# Patient Record
Sex: MALE | Race: BLACK OR AFRICAN AMERICAN | Employment: UNEMPLOYED | URBAN - METROPOLITAN AREA
[De-identification: names, ages, dates, MRNs, and addresses within clinical notes are randomized per-mention and may not be internally consistent; named-entity substitution may affect disease eponyms.]

---

## 2018-04-09 ENCOUNTER — APPOINTMENT (OUTPATIENT)
Dept: OCCUPATIONAL MEDICINE | Age: 48
End: 2018-04-09
Attending: FAMILY MEDICINE

## 2021-02-18 ENCOUNTER — OFFICE VISIT (OUTPATIENT)
Dept: INTERNAL MEDICINE | Age: 51
End: 2021-02-18

## 2021-02-18 VITALS
HEIGHT: 67 IN | BODY MASS INDEX: 30.61 KG/M2 | TEMPERATURE: 99 F | HEART RATE: 84 BPM | SYSTOLIC BLOOD PRESSURE: 134 MMHG | RESPIRATION RATE: 14 BRPM | DIASTOLIC BLOOD PRESSURE: 82 MMHG | WEIGHT: 195 LBS

## 2021-02-18 DIAGNOSIS — Z00.00 ROUTINE GENERAL MEDICAL EXAMINATION AT A HEALTH CARE FACILITY: Primary | ICD-10-CM

## 2021-02-18 DIAGNOSIS — B86 SCABIES: ICD-10-CM

## 2021-02-18 DIAGNOSIS — L98.9 SKIN LESIONS, GENERALIZED: ICD-10-CM

## 2021-02-18 PROCEDURE — 99386 PREV VISIT NEW AGE 40-64: CPT | Performed by: INTERNAL MEDICINE

## 2021-02-18 RX ORDER — IVERMECTIN 3 MG/1
200 TABLET ORAL ONCE
Qty: 12 TABLET | Refills: 0 | Status: SHIPPED | OUTPATIENT
Start: 2021-02-18 | End: 2021-04-08 | Stop reason: SDUPTHER

## 2021-02-18 ASSESSMENT — PATIENT HEALTH QUESTIONNAIRE - PHQ9
SUM OF ALL RESPONSES TO PHQ9 QUESTIONS 1 AND 2: 0
1. LITTLE INTEREST OR PLEASURE IN DOING THINGS: NOT AT ALL
CLINICAL INTERPRETATION OF PHQ9 SCORE: NO FURTHER SCREENING NEEDED
2. FEELING DOWN, DEPRESSED OR HOPELESS: NOT AT ALL
CLINICAL INTERPRETATION OF PHQ2 SCORE: NO FURTHER SCREENING NEEDED
SUM OF ALL RESPONSES TO PHQ9 QUESTIONS 1 AND 2: 0

## 2021-02-18 ASSESSMENT — ENCOUNTER SYMPTOMS
ROS SKIN COMMENTS: AS ABOVE
GASTROINTESTINAL NEGATIVE: 1
PSYCHIATRIC NEGATIVE: 1
EYES NEGATIVE: 1
CONSTITUTIONAL NEGATIVE: 1
ALLERGIC/IMMUNOLOGIC NEGATIVE: 1
ENDOCRINE NEGATIVE: 1
HEMATOLOGIC/LYMPHATIC NEGATIVE: 1
RESPIRATORY NEGATIVE: 1
NEUROLOGICAL NEGATIVE: 1

## 2021-02-22 ENCOUNTER — LAB SERVICES (OUTPATIENT)
Dept: LAB | Age: 51
End: 2021-02-22

## 2021-02-22 DIAGNOSIS — Z00.00 ROUTINE GENERAL MEDICAL EXAMINATION AT A HEALTH CARE FACILITY: ICD-10-CM

## 2021-02-22 LAB
ALBUMIN SERPL-MCNC: 4.1 G/DL (ref 3.6–5.1)
ALP SERPL-CCNC: 61 U/L (ref 45–115)
ALT SERPL W/O P-5'-P-CCNC: 25 U/L (ref 5–49)
AST SERPL-CCNC: 34 U/L (ref 14–43)
BILIRUB SERPL-MCNC: 0.2 MG/DL (ref 0–1.3)
BUN SERPL-MCNC: 13 MG/DL (ref 6–27)
CALCIUM SERPL-MCNC: 9.8 MG/DL (ref 8.6–10.6)
CHLORIDE SERPL-SCNC: 106 MMOL/L (ref 96–107)
CHOLEST SERPL-MCNC: 140 MG/DL (ref 140–200)
CO2 SERPL-SCNC: 29 MMOL/L (ref 22–32)
CREAT SERPL-MCNC: 1.2 MG/DL (ref 0.6–1.6)
GFR SERPL CREATININE-BSD FRML MDRD: >60 ML/MIN/{1.73M2}
GFR SERPL CREATININE-BSD FRML MDRD: >60 ML/MIN/{1.73M2}
GLUCOSE P FAST SERPL-MCNC: 95 MG/DL (ref 60–100)
HDLC SERPL-MCNC: 44 MG/DL
LDLC SERPL CALC-MCNC: 77 MG/DL (ref 30–100)
POTASSIUM SERPL-SCNC: 4.1 MMOL/L (ref 3.5–5.3)
PROT SERPL-MCNC: 7.2 G/DL (ref 6.4–8.5)
SODIUM SERPL-SCNC: 142 MMOL/L (ref 136–146)
TRIGL SERPL-MCNC: 96 MG/DL (ref 0–200)

## 2021-02-22 PROCEDURE — 80061 LIPID PANEL: CPT | Performed by: INTERNAL MEDICINE

## 2021-02-22 PROCEDURE — 36415 COLL VENOUS BLD VENIPUNCTURE: CPT | Performed by: INTERNAL MEDICINE

## 2021-02-22 PROCEDURE — 80053 COMPREHEN METABOLIC PANEL: CPT | Performed by: INTERNAL MEDICINE

## 2021-03-04 ENCOUNTER — OFFICE VISIT (OUTPATIENT)
Dept: DERMATOLOGY | Age: 51
End: 2021-03-04

## 2021-03-04 DIAGNOSIS — D23.9 DERMATOFIBROMA: Primary | ICD-10-CM

## 2021-03-04 PROCEDURE — 99203 OFFICE O/P NEW LOW 30 MIN: CPT | Performed by: DERMATOLOGY

## 2021-03-09 ENCOUNTER — TELEPHONE (OUTPATIENT)
Dept: INTERNAL MEDICINE | Age: 51
End: 2021-03-09

## 2021-03-10 RX ORDER — PERMETHRIN 50 MG/G
CREAM TOPICAL ONCE
Qty: 60 G | Refills: 0 | Status: SHIPPED | OUTPATIENT
Start: 2021-03-10 | End: 2021-04-08 | Stop reason: SDUPTHER

## 2021-04-08 ENCOUNTER — OFFICE VISIT (OUTPATIENT)
Dept: INTERNAL MEDICINE | Age: 51
End: 2021-04-08

## 2021-04-08 VITALS
TEMPERATURE: 96.2 F | HEART RATE: 84 BPM | SYSTOLIC BLOOD PRESSURE: 120 MMHG | WEIGHT: 192 LBS | BODY MASS INDEX: 30.13 KG/M2 | RESPIRATION RATE: 16 BRPM | DIASTOLIC BLOOD PRESSURE: 78 MMHG | HEIGHT: 67 IN

## 2021-04-08 DIAGNOSIS — B86 SCABIES: Primary | ICD-10-CM

## 2021-04-08 PROCEDURE — 99213 OFFICE O/P EST LOW 20 MIN: CPT | Performed by: INTERNAL MEDICINE

## 2021-04-08 RX ORDER — IVERMECTIN 3 MG/1
200 TABLET ORAL ONCE
Qty: 12 TABLET | Refills: 0 | Status: SHIPPED | OUTPATIENT
Start: 2021-04-08 | End: 2021-04-08

## 2021-04-08 RX ORDER — TRIAMCINOLONE ACETONIDE 0.25 MG/G
CREAM TOPICAL PRN
COMMUNITY
Start: 2021-03-31 | End: 2021-07-16 | Stop reason: ALTCHOICE

## 2021-04-08 RX ORDER — TADALAFIL 10 MG/1
10 TABLET ORAL PRN
Qty: 10 TABLET | Refills: 1 | Status: SHIPPED | OUTPATIENT
Start: 2021-04-08 | End: 2021-07-16 | Stop reason: ALTCHOICE

## 2021-04-08 RX ORDER — HYDROXYZINE HYDROCHLORIDE 25 MG/1
25 TABLET, FILM COATED ORAL 3 TIMES DAILY PRN
COMMUNITY
Start: 2021-03-31 | End: 2021-07-16 | Stop reason: ALTCHOICE

## 2021-04-08 RX ORDER — PERMETHRIN 50 MG/G
CREAM TOPICAL ONCE
Qty: 60 G | Refills: 0 | Status: SHIPPED | OUTPATIENT
Start: 2021-04-08 | End: 2021-04-08

## 2021-04-08 ASSESSMENT — ENCOUNTER SYMPTOMS
CHILLS: 0
SHORTNESS OF BREATH: 0
FEVER: 0

## 2021-05-26 ENCOUNTER — OFFICE VISIT (OUTPATIENT)
Dept: DERMATOLOGY | Age: 51
End: 2021-05-26

## 2021-05-26 DIAGNOSIS — L29.9 ITCHING: Primary | ICD-10-CM

## 2021-05-26 PROCEDURE — 99213 OFFICE O/P EST LOW 20 MIN: CPT | Performed by: DERMATOLOGY

## 2021-05-26 RX ORDER — HYDROCORTISONE VALERATE CREAM 2 MG/G
CREAM TOPICAL 2 TIMES DAILY
Qty: 30 G | Refills: 1 | Status: SHIPPED | OUTPATIENT
Start: 2021-05-26 | End: 2021-07-16 | Stop reason: ALTCHOICE

## 2021-05-26 RX ORDER — IVERMECTIN 3 MG/1
TABLET ORAL
COMMUNITY
Start: 2021-04-08 | End: 2021-07-16 | Stop reason: ALTCHOICE

## 2021-05-26 RX ORDER — PERMETHRIN 50 MG/G
CREAM TOPICAL
COMMUNITY
Start: 2021-04-08 | End: 2021-07-16 | Stop reason: ALTCHOICE

## 2021-06-03 ENCOUNTER — APPOINTMENT (OUTPATIENT)
Dept: DERMATOLOGY | Age: 51
End: 2021-06-03

## 2021-06-17 ENCOUNTER — TELEPHONE (OUTPATIENT)
Dept: DERMATOLOGY | Age: 51
End: 2021-06-17

## 2021-07-08 ENCOUNTER — APPOINTMENT (OUTPATIENT)
Dept: DERMATOLOGY | Age: 51
End: 2021-07-08

## 2021-07-12 ENCOUNTER — NURSE TRIAGE (OUTPATIENT)
Dept: DERMATOLOGY | Age: 51
End: 2021-07-12

## 2021-07-14 ENCOUNTER — OFFICE VISIT (OUTPATIENT)
Dept: DERMATOLOGY | Age: 51
End: 2021-07-14

## 2021-07-14 DIAGNOSIS — L98.9 DERMATOSIS: Primary | ICD-10-CM

## 2021-07-14 PROCEDURE — 11102 TANGNTL BX SKIN SINGLE LES: CPT | Performed by: DERMATOLOGY

## 2021-07-14 PROCEDURE — 99214 OFFICE O/P EST MOD 30 MIN: CPT | Performed by: DERMATOLOGY

## 2021-07-14 PROCEDURE — 11103 TANGNTL BX SKIN EA SEP/ADDL: CPT | Performed by: DERMATOLOGY

## 2021-07-14 RX ORDER — CLINDAMYCIN PHOSPHATE 10 UG/ML
LOTION TOPICAL
Qty: 120 ML | Refills: 11 | Status: SHIPPED | OUTPATIENT
Start: 2021-07-14

## 2021-07-16 ENCOUNTER — TELEPHONE (OUTPATIENT)
Dept: INTERNAL MEDICINE | Age: 51
End: 2021-07-16

## 2021-07-16 ENCOUNTER — OFFICE VISIT (OUTPATIENT)
Dept: INTERNAL MEDICINE | Age: 51
End: 2021-07-16

## 2021-07-16 ENCOUNTER — IMAGING SERVICES (OUTPATIENT)
Dept: GENERAL RADIOLOGY | Age: 51
End: 2021-07-16
Attending: INTERNAL MEDICINE

## 2021-07-16 VITALS
HEART RATE: 96 BPM | BODY MASS INDEX: 28.88 KG/M2 | TEMPERATURE: 98 F | RESPIRATION RATE: 16 BRPM | HEIGHT: 67 IN | DIASTOLIC BLOOD PRESSURE: 90 MMHG | WEIGHT: 184 LBS | SYSTOLIC BLOOD PRESSURE: 152 MMHG

## 2021-07-16 DIAGNOSIS — R07.81 RIB PAIN ON RIGHT SIDE: ICD-10-CM

## 2021-07-16 DIAGNOSIS — R07.81 RIB PAIN ON RIGHT SIDE: Primary | ICD-10-CM

## 2021-07-16 LAB — PATH REPORT PLASRBC-IMP: NORMAL

## 2021-07-16 PROCEDURE — 71101 X-RAY EXAM UNILAT RIBS/CHEST: CPT | Performed by: RADIOLOGY

## 2021-07-16 PROCEDURE — 99213 OFFICE O/P EST LOW 20 MIN: CPT | Performed by: INTERNAL MEDICINE

## 2021-07-16 RX ORDER — SILDENAFIL 50 MG/1
50 TABLET, FILM COATED ORAL PRN
Qty: 10 TABLET | Refills: 0 | Status: SHIPPED | OUTPATIENT
Start: 2021-07-16

## 2021-07-16 ASSESSMENT — PATIENT HEALTH QUESTIONNAIRE - PHQ9
SUM OF ALL RESPONSES TO PHQ9 QUESTIONS 1 AND 2: 0
SUM OF ALL RESPONSES TO PHQ9 QUESTIONS 1 AND 2: 0
CLINICAL INTERPRETATION OF PHQ9 SCORE: NO FURTHER SCREENING NEEDED
2. FEELING DOWN, DEPRESSED OR HOPELESS: NOT AT ALL
1. LITTLE INTEREST OR PLEASURE IN DOING THINGS: NOT AT ALL
CLINICAL INTERPRETATION OF PHQ2 SCORE: NO FURTHER SCREENING NEEDED

## 2021-07-16 ASSESSMENT — PAIN SCALES - GENERAL: PAINLEVEL: 9

## 2021-07-21 RX ORDER — PIMECROLIMUS 10 MG/G
CREAM TOPICAL 2 TIMES DAILY
Qty: 100 G | Refills: 1 | Status: SHIPPED | OUTPATIENT
Start: 2021-07-21

## 2021-07-22 ENCOUNTER — TELEPHONE (OUTPATIENT)
Dept: DERMATOLOGY | Age: 51
End: 2021-07-22

## 2021-07-23 ENCOUNTER — TELEPHONE (OUTPATIENT)
Dept: DERMATOLOGY | Age: 51
End: 2021-07-23

## 2024-10-29 ENCOUNTER — OFFICE VISIT (OUTPATIENT)
Dept: INTERNAL MEDICINE CLINIC | Facility: CLINIC | Age: 54
End: 2024-10-29
Payer: COMMERCIAL

## 2024-10-29 VITALS
DIASTOLIC BLOOD PRESSURE: 90 MMHG | TEMPERATURE: 97 F | HEIGHT: 67 IN | WEIGHT: 204 LBS | OXYGEN SATURATION: 97 % | HEART RATE: 98 BPM | BODY MASS INDEX: 32.02 KG/M2 | SYSTOLIC BLOOD PRESSURE: 150 MMHG

## 2024-10-29 DIAGNOSIS — Z00.00 HEALTH MAINTENANCE EXAMINATION: Primary | ICD-10-CM

## 2024-10-29 DIAGNOSIS — I10 PRIMARY HYPERTENSION: ICD-10-CM

## 2024-10-29 DIAGNOSIS — E66.811 CLASS 1 OBESITY DUE TO EXCESS CALORIES WITH SERIOUS COMORBIDITY AND BODY MASS INDEX (BMI) OF 31.0 TO 31.9 IN ADULT: ICD-10-CM

## 2024-10-29 DIAGNOSIS — N52.9 ERECTILE DYSFUNCTION, UNSPECIFIED ERECTILE DYSFUNCTION TYPE: ICD-10-CM

## 2024-10-29 DIAGNOSIS — E66.09 CLASS 1 OBESITY DUE TO EXCESS CALORIES WITH SERIOUS COMORBIDITY AND BODY MASS INDEX (BMI) OF 31.0 TO 31.9 IN ADULT: ICD-10-CM

## 2024-10-29 DIAGNOSIS — F17.210 CIGARETTE SMOKER: ICD-10-CM

## 2024-10-29 LAB
ALBUMIN SERPL-MCNC: 4.7 G/DL (ref 3.2–4.8)
ALBUMIN/GLOB SERPL: 1.5 {RATIO} (ref 1–2)
ALP LIVER SERPL-CCNC: 56 U/L
ALT SERPL-CCNC: 44 U/L
ANION GAP SERPL CALC-SCNC: 10 MMOL/L (ref 0–18)
AST SERPL-CCNC: 46 U/L (ref ?–34)
BASOPHILS # BLD AUTO: 0.03 X10(3) UL (ref 0–0.2)
BASOPHILS NFR BLD AUTO: 0.6 %
BILIRUB SERPL-MCNC: 0.5 MG/DL (ref 0.3–1.2)
BILIRUB UR QL: NEGATIVE
BUN BLD-MCNC: 15 MG/DL (ref 9–23)
BUN/CREAT SERPL: 12.2 (ref 10–20)
CALCIUM BLD-MCNC: 10 MG/DL (ref 8.7–10.4)
CHLORIDE SERPL-SCNC: 110 MMOL/L (ref 98–112)
CHOLEST SERPL-MCNC: 171 MG/DL (ref ?–200)
CLARITY UR: CLEAR
CO2 SERPL-SCNC: 25 MMOL/L (ref 21–32)
COLOR UR: YELLOW
CREAT BLD-MCNC: 1.23 MG/DL
DEPRECATED RDW RBC AUTO: 47 FL (ref 35.1–46.3)
EGFRCR SERPLBLD CKD-EPI 2021: 71 ML/MIN/1.73M2 (ref 60–?)
EOSINOPHIL # BLD AUTO: 0.06 X10(3) UL (ref 0–0.7)
EOSINOPHIL NFR BLD AUTO: 1.3 %
ERYTHROCYTE [DISTWIDTH] IN BLOOD BY AUTOMATED COUNT: 13.1 % (ref 11–15)
EST. AVERAGE GLUCOSE BLD GHB EST-MCNC: 114 MG/DL (ref 68–126)
FASTING PATIENT LIPID ANSWER: NO
FASTING STATUS PATIENT QL REPORTED: NO
GLOBULIN PLAS-MCNC: 3.2 G/DL (ref 2–3.5)
GLUCOSE BLD-MCNC: 130 MG/DL (ref 70–99)
GLUCOSE UR-MCNC: NORMAL MG/DL
HBA1C MFR BLD: 5.6 % (ref ?–5.7)
HCT VFR BLD AUTO: 46.2 %
HCV AB SERPL QL IA: NONREACTIVE
HDLC SERPL-MCNC: 49 MG/DL (ref 40–59)
HGB BLD-MCNC: 15.8 G/DL
IMM GRANULOCYTES # BLD AUTO: 0.02 X10(3) UL (ref 0–1)
IMM GRANULOCYTES NFR BLD: 0.4 %
KETONES UR-MCNC: NEGATIVE MG/DL
LDLC SERPL CALC-MCNC: 62 MG/DL (ref ?–100)
LEUKOCYTE ESTERASE UR QL STRIP.AUTO: NEGATIVE
LYMPHOCYTES # BLD AUTO: 1.51 X10(3) UL (ref 1–4)
LYMPHOCYTES NFR BLD AUTO: 31.5 %
MCH RBC QN AUTO: 33.2 PG (ref 26–34)
MCHC RBC AUTO-ENTMCNC: 34.2 G/DL (ref 31–37)
MCV RBC AUTO: 97.1 FL
MONOCYTES # BLD AUTO: 0.43 X10(3) UL (ref 0.1–1)
MONOCYTES NFR BLD AUTO: 9 %
NEUTROPHILS # BLD AUTO: 2.75 X10 (3) UL (ref 1.5–7.7)
NEUTROPHILS # BLD AUTO: 2.75 X10(3) UL (ref 1.5–7.7)
NEUTROPHILS NFR BLD AUTO: 57.2 %
NITRITE UR QL STRIP.AUTO: NEGATIVE
NONHDLC SERPL-MCNC: 122 MG/DL (ref ?–130)
OSMOLALITY SERPL CALC.SUM OF ELEC: 303 MOSM/KG (ref 275–295)
PH UR: 5 [PH] (ref 5–8)
PLATELET # BLD AUTO: 235 10(3)UL (ref 150–450)
POTASSIUM SERPL-SCNC: 4.1 MMOL/L (ref 3.5–5.1)
PROT SERPL-MCNC: 7.9 G/DL (ref 5.7–8.2)
PROT UR-MCNC: 20 MG/DL
RBC # BLD AUTO: 4.76 X10(6)UL
SODIUM SERPL-SCNC: 145 MMOL/L (ref 136–145)
SP GR UR STRIP: >1.03 (ref 1–1.03)
TRIGL SERPL-MCNC: 388 MG/DL (ref 30–149)
TSI SER-ACNC: 0.74 MIU/ML (ref 0.55–4.78)
UROBILINOGEN UR STRIP-ACNC: NORMAL
VLDLC SERPL CALC-MCNC: 58 MG/DL (ref 0–30)
WBC # BLD AUTO: 4.8 X10(3) UL (ref 4–11)

## 2024-10-29 PROCEDURE — 83036 HEMOGLOBIN GLYCOSYLATED A1C: CPT | Performed by: FAMILY MEDICINE

## 2024-10-29 PROCEDURE — 90471 IMMUNIZATION ADMIN: CPT | Performed by: FAMILY MEDICINE

## 2024-10-29 PROCEDURE — 90677 PCV20 VACCINE IM: CPT | Performed by: FAMILY MEDICINE

## 2024-10-29 PROCEDURE — 81001 URINALYSIS AUTO W/SCOPE: CPT | Performed by: FAMILY MEDICINE

## 2024-10-29 PROCEDURE — 86803 HEPATITIS C AB TEST: CPT | Performed by: FAMILY MEDICINE

## 2024-10-29 PROCEDURE — 3077F SYST BP >= 140 MM HG: CPT | Performed by: FAMILY MEDICINE

## 2024-10-29 PROCEDURE — 80050 GENERAL HEALTH PANEL: CPT | Performed by: FAMILY MEDICINE

## 2024-10-29 PROCEDURE — 3008F BODY MASS INDEX DOCD: CPT | Performed by: FAMILY MEDICINE

## 2024-10-29 PROCEDURE — 99386 PREV VISIT NEW AGE 40-64: CPT | Performed by: FAMILY MEDICINE

## 2024-10-29 PROCEDURE — 90472 IMMUNIZATION ADMIN EACH ADD: CPT | Performed by: FAMILY MEDICINE

## 2024-10-29 PROCEDURE — 80061 LIPID PANEL: CPT | Performed by: FAMILY MEDICINE

## 2024-10-29 PROCEDURE — 90715 TDAP VACCINE 7 YRS/> IM: CPT | Performed by: FAMILY MEDICINE

## 2024-10-29 PROCEDURE — 99406 BEHAV CHNG SMOKING 3-10 MIN: CPT | Performed by: FAMILY MEDICINE

## 2024-10-29 PROCEDURE — 3080F DIAST BP >= 90 MM HG: CPT | Performed by: FAMILY MEDICINE

## 2024-10-29 RX ORDER — SILDENAFIL 50 MG/1
50 TABLET, FILM COATED ORAL
Qty: 10 TABLET | Refills: 3 | Status: SHIPPED | OUTPATIENT
Start: 2024-10-29

## 2024-10-29 RX ORDER — LOSARTAN POTASSIUM 25 MG/1
25 TABLET ORAL DAILY
Qty: 90 TABLET | Refills: 0 | Status: SHIPPED | OUTPATIENT
Start: 2024-10-29

## 2024-10-29 NOTE — PROGRESS NOTES
FAMILY MEDICINE CLINIC NOTE    HPI  Forrest Ferguson is a 53 year old male presenting for physical    #Health Maintenance  -Diet: Eats 3-4 meals a day  -Exercise: Occasionally weight lifts - twice a week  -Lung cancer screen: Not indicated  -Colon cancer screen: Indicated  -Prostate cancer screen: Discussion held with patient. Declined screen.  -Aortic aneurysm screen: Not indicated - will need at 67  -Statin:  Will check lipid panel  -ASA: Not indicated  -HIV screen: - Reports negative in the past  -Hep C screen: Indicated  -Gonorrhea/chlamydia:  Not indicated  -Syphillis: Not indicated  -TB: Not indicated  -Tobacco/alcohol: Per below  -Depression: PHQ-2 score of 0 (score >/= 3 do PHQ-9)  -Advanced Directive: Indicated    #Immunizations  -Tdap: Indicated  -Flu shot: Not indicated - declines  -PCV13: Not indicated   -PCV20: Indicated   -PPSV23: Not indicated   -HPV: Not indicated  -RZV (preferred) or VZL: Indicated - will complete at a future    -RSV: Not indicated   -COVID: Indicated    #Cigarette  -0.5 ppd smoker    #ED  -intermittently having hard time getting erection  -still with morning erections   -desiring viagra    #Patient Care Team  No care team member to display    ROS  GENERAL: No fever/chills, no recent weight loss   HEENT: No visual changes, no changes in hearing, no sore throats  NECK: No pain, no swelling  RESP: No cough, no SOB  CV: No chest pain, no palpitations  GI: No abd pain, no N/V/D  MSK: No edema, no pain  SKIN: No new rashes  NEURO: No numbness, no tingling, no HA    HEALTH MAINTENANCE CHECKLIST  Health Maintenance Topics with due status: Overdue       Topic Date Due    Annual Physical Never done    Colorectal Cancer Screening Never done    DTaP,Tdap,and Td Vaccines Never done    PSA Never done    Zoster Vaccines Never done    Annual Depression Screening Never done    COVID-19 Vaccine 09/01/2024    Influenza Vaccine Never done       ALLERGIES  Allergies[1]    MEDICATIONS  Current Outpatient  Medications   Medication Sig Dispense Refill    Sildenafil Citrate 50 MG Oral Tab Take 1 tablet (50 mg total) by mouth daily as needed for Erectile Dysfunction. 10 tablet 3    losartan 25 MG Oral Tab Take 1 tablet (25 mg total) by mouth daily. 90 tablet 0       ACTIVE PROBLEM  Patient Active Problem List   Diagnosis    Cigarette smoker    Health maintenance examination    Class 1 obesity due to excess calories with serious comorbidity and body mass index (BMI) of 31.0 to 31.9 in adult    Erectile dysfunction    Primary hypertension       PAST MEDICAL HISTORY  Past Medical History:    Primary hypertension       PAST SOCIAL HISTORY  Social History     Socioeconomic History    Marital status: Single     Spouse name: Not on file    Number of children: Not on file    Years of education: Not on file    Highest education level: Not on file   Occupational History    Not on file   Tobacco Use    Smoking status: Every Day     Current packs/day: 0.50     Average packs/day: 0.5 packs/day for 36.8 years (18.4 ttl pk-yrs)     Types: Cigarettes     Start date: 1988    Smokeless tobacco: Never   Vaping Use    Vaping status: Never Used   Substance and Sexual Activity    Alcohol use: Yes     Comment: Occcasional - weekends    Drug use: Never    Sexual activity: Yes     Partners: Female   Other Topics Concern    Not on file   Social History Narrative    Relationships:     Children: Dania Guillen Maklya (12F)    Pets: None    School: N/A    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     Spiritual: Spiritual     Social Drivers of Health     Financial Resource Strain: Not on file   Food Insecurity: Not on file   Transportation Needs: Not on file   Physical Activity: Not on file   Stress: Not on file   Social Connections: Not on file   Housing Stability: Not on file       PAST SURGICAL HISTORY  Past Surgical History:   Procedure Laterality Date    Abdominal surgery      [employer]        PAST FAMILY HISTORY  Family History   Problem Relation Age of Onset    Cancer Mother         Cervical    Cancer Father         Pancreatic    No Known Problems Sister     No Known Problems Sister     No Known Problems Sister     No Known Problems Brother     Colon Cancer Brother     No Known Problems Brother     No Known Problems Brother     No Known Problems Maternal Grandmother     No Known Problems Maternal Grandfather     No Known Problems Paternal Grandmother     No Known Problems Paternal Grandfather     Prostate Cancer Neg        PHYSICAL EXAM  Vitals:    10/29/24 0818 10/29/24 0846   BP: 148/88 150/90   Pulse: 98    Temp: 97.1 °F (36.2 °C)    SpO2: 97%    Weight: 204 lb (92.5 kg)    Height: 5' 7\" (1.702 m)       Body mass index is 31.95 kg/m².    GENERAL: NAD  HEENT: Moist mucous membranes, no tonsillar swelling or erythema, PERRLA bilat, TM translucent and non-bulging  NECK: Supple, non-tender  RESP: CTAB, no wheezing, no rales, no rhonchi  CV: RRR, no murmurs  GI: Soft, non-distended, non-tender, no guarding, no rebound, no masses  : No penile lesions. No palpable testicular mass.   MSK: No edema  SKIN: Warm and dry, no rashes  NEURO: Answering questions appropriately    LABS  No results found for: \"WBC\", \"HGB\", \"HCT\", \"PLT\", \"NEPERCENT\", \"LYPERCENT\", \"MOPERCENT\", \"EOPERCENT\", \"BAPERCENT\", \"NE\", \"LYMABS\", \"MOABSO\", \"EOABSO\", \"BAABSO\", \"REITCPERCENT\"    No results found for: \"NA\", \"K\", \"CL\", \"CO2\", \"ANIONGAP\", \"BUN\", \"CREATSERUM\", \"BUNCREA\", \"GLU\", \"CA\", \"OSMOCALC\", \"GFRNAA\", \"GFRAA\", \"ALT\", \"AST\", \"ALKPHO\", \"BILT\", \"TP\", \"ALB\", \"GLOBULIN\", \"ELECTAG\", \"FASTING\"      No results found for: \"CHOLEST\", \"TRIG\", \"HDL\", \"LDL\", \"VLDL\", \"TCHDLRATIO\", \"NONHDLC\", \"CHOLHDLRATIO\", \"CALCNONHDL\"     DIAGNOSTICS    ASSESSMENT/PLAN  Problem List Items Addressed This Visit          Cardiac and Vasculature    Primary hypertension     Patient with a new diagnosis of hypertension.  Multiple elevated blood pressure  readings in the office.  Start losartan 25 mg daily.  Monitor blood pressures at home.  If blood pressures greater than 140/90 after 5 days, advise increasing losartan to 50 mg daily.  Check labs including CBC, CMP, urinalysis.  Will need baseline EKG at next office visit.  Follow-up in 2 weeks.         Relevant Medications    Sildenafil Citrate 50 MG Oral Tab    losartan 25 MG Oral Tab    Other Relevant Orders    Urinalysis with Culture Reflex [E]       Endocrine and Metabolic    Class 1 obesity due to excess calories with serious comorbidity and body mass index (BMI) of 31.0 to 31.9 in adult     Healthy diet and exercise advised  Check labs.         Relevant Orders    Comp Metabolic Panel (14)    Hemoglobin A1C    Lipid Panel    TSH W Reflex To Free T4       Genitourinary and Reproductive    Erectile dysfunction     Lifestyle modifications discussed.  Weight loss advised  Smoking cessation advised.  Sildenafil 50 mg as needed prior to sex         Relevant Medications    Sildenafil Citrate 50 MG Oral Tab    losartan 25 MG Oral Tab       Tobacco    Cigarette smoker     Patient is a cigarette smoker  Smoking cessation advised  Primary 20 vaccine today.         Relevant Orders    Tobacco Use Counseling 3-10 Min [54635]       Health Encounters    Health maintenance examination - Primary     Exercise and diet advised.  CBC, CMP, lipid panel, Hba1c, TSH, hepatitis C screen  Tdap today.  Shingles vaccine - will provide at a future time  Prevnar 20 vaccine today  Flu shot declined.  COVID vaccine advised.  Advanced directive information provided.  Cologuard ordered         Relevant Orders    CBC With Differential With Platelet    Comp Metabolic Panel (14)    HCV Antibody    Hemoglobin A1C    Lipid Panel    TSH W Reflex To Free T4    COLOGUARD COLON CANCER SCREENING (EXTERNAL)    PCV20 VACCINE FOR INTRAMUSCULAR USE    TETANUS, DIPHTHERIA TOXOIDS AND ACELLULAR PERTUSIS VACCINE (TDAP), >7 YEARS, IM USE       Return in about 2  weeks (around 11/12/2024) for follow up.    Tobacco cessation counseling for <3 minutes.    Bradley Gandara MD  Family Medicine         [1]   Allergies  Allergen Reactions    Penicillins UNKNOWN

## 2024-10-29 NOTE — ASSESSMENT & PLAN NOTE
Lifestyle modifications discussed.  Weight loss advised  Smoking cessation advised.  Sildenafil 50 mg as needed prior to sex

## 2024-10-29 NOTE — ASSESSMENT & PLAN NOTE
Patient with a new diagnosis of hypertension.  Multiple elevated blood pressure readings in the office.  Start losartan 25 mg daily.  Monitor blood pressures at home.  If blood pressures greater than 140/90 after 5 days, advise increasing losartan to 50 mg daily.  Check labs including CBC, CMP, urinalysis.  Will need baseline EKG at next office visit.  Follow-up in 2 weeks.

## 2024-10-29 NOTE — PATIENT INSTRUCTIONS
PATIENT INSTRUCTIONS    Thank you for seeing me today, it was a pleasure taking care of you.  Please check out at the  and schedule a follow up appointment.  Return in about 2 weeks (around 11/12/2024) for follow up.  Please remember that the preferred danielle period for appointments is 5 minutes. This is to help maximize the amount of time that we can spend together at our visits.    Please get your labs drawn - you may need to schedule a lab appointment if this was not completed at your recent doctor's visit.  The following imaging studies were ordered: None  Please also follow up with the following specialists: None  Please fill out the advance directive information (power of  documents) and bring a copy to our clinic.  Please monitor your blood pressures at home using a blood pressure machine with a cuff that goes over your arm (not your wrist). If you do not have a blood pressure machine, you can consider purchasing an [employer] blood pressure machine (available on [employer]).   Please check your blood pressures at once a day and record your blood pressures in a log.   Please bring your blood pressure log to your next doctor's appointment with me.  Please also bring your blood pressure machine to your next doctor's appointment to see if it is accurate.  Losartan 25 mg daily  If blood pressure >140/90 consistently after 5 days of the medication, then increase losartan to 50 mg daily (take two of the 25 mg at the same time)  Sildenafil 50 mg as needed prior to sex  [email or web address]  Shingles vaccine in the future   Cologuard stool test - mailed to your home      Best,   Dr. Gandara      Quitting Smoking    Quitting smoking is the most important step you can take to improve your health. We're glad you have set a goal to improve your health.    Quit Smoking Resources    In addition to medications, use the STAR plan to help you successfully quit.   Stick with your quit date!   Tell friends, family, and coworkers  your quit date. Request their understanding and support.  Anticipate and prepare for challenges. Some examples are withdrawal symptoms, being around others who smoke, and drinking alcohol.  Remove all tobacco products and paraphernalia from your environment. Make your home and vehicles smoke-free.    Free resources for additional support:  National tobacco quitline: 1-800-QUIT-NOW (1-775.330.3478).  SmokefreeTXT is a free text program to assist you in quitting. Visit https://www.smokefree.gov/smokefreetxt for more information.  Feel free to call your care manager at ([phone]) for additional support.

## 2024-10-29 NOTE — ASSESSMENT & PLAN NOTE
Exercise and diet advised.  CBC, CMP, lipid panel, Hba1c, TSH, hepatitis C screen  Tdap today.  Shingles vaccine - will provide at a future time  Prevnar 20 vaccine today  Flu shot declined.  COVID vaccine advised.  Advanced directive information provided.  Cologuard ordered

## 2024-10-30 ENCOUNTER — TELEPHONE (OUTPATIENT)
Dept: INTERNAL MEDICINE CLINIC | Facility: CLINIC | Age: 54
End: 2024-10-30

## 2024-10-30 NOTE — TELEPHONE ENCOUNTER
Patient with lab abnormalities that need to be addressed. Started on blood pressure medication and needs follow up. Please call patient to schedule a follow up in 2-3 weeks so that we can monitor his blood pressures and I can also review his findings. This was discussed with him during the last office visit. ThanksBradley

## 2024-10-31 NOTE — TELEPHONE ENCOUNTER
I also called explaining that I need to see him for a follow up to review lab tests, provide referrals and also monitor his blood pressures. No response. Left voicemail.

## 2024-10-31 NOTE — TELEPHONE ENCOUNTER
I called and left a voicemail for the patient to schedule a follow up appointment per Dr. Gandara.

## 2024-11-12 ENCOUNTER — OFFICE VISIT (OUTPATIENT)
Dept: INTERNAL MEDICINE CLINIC | Facility: CLINIC | Age: 54
End: 2024-11-12
Payer: COMMERCIAL

## 2024-11-12 VITALS
TEMPERATURE: 98 F | HEART RATE: 101 BPM | SYSTOLIC BLOOD PRESSURE: 146 MMHG | DIASTOLIC BLOOD PRESSURE: 94 MMHG | HEIGHT: 67 IN | WEIGHT: 206 LBS | OXYGEN SATURATION: 97 % | BODY MASS INDEX: 32.33 KG/M2

## 2024-11-12 DIAGNOSIS — E66.09 CLASS 1 OBESITY DUE TO EXCESS CALORIES WITH SERIOUS COMORBIDITY AND BODY MASS INDEX (BMI) OF 32.0 TO 32.9 IN ADULT: ICD-10-CM

## 2024-11-12 DIAGNOSIS — I10 PRIMARY HYPERTENSION: Primary | ICD-10-CM

## 2024-11-12 DIAGNOSIS — R80.9 PROTEINURIA, UNSPECIFIED TYPE: ICD-10-CM

## 2024-11-12 DIAGNOSIS — E66.811 CLASS 1 OBESITY DUE TO EXCESS CALORIES WITH SERIOUS COMORBIDITY AND BODY MASS INDEX (BMI) OF 32.0 TO 32.9 IN ADULT: ICD-10-CM

## 2024-11-12 DIAGNOSIS — R74.8 ELEVATED LIVER ENZYMES: ICD-10-CM

## 2024-11-12 DIAGNOSIS — F17.210 CIGARETTE SMOKER: ICD-10-CM

## 2024-11-12 DIAGNOSIS — R31.9 HEMATURIA, UNSPECIFIED TYPE: ICD-10-CM

## 2024-11-12 DIAGNOSIS — N52.9 ERECTILE DYSFUNCTION, UNSPECIFIED ERECTILE DYSFUNCTION TYPE: ICD-10-CM

## 2024-11-12 LAB
ATRIAL RATE: 93 BPM
P AXIS: 72 DEGREES
P-R INTERVAL: 172 MS
Q-T INTERVAL: 338 MS
QRS DURATION: 94 MS
QTC CALCULATION (BEZET): 420 MS
R AXIS: 12 DEGREES
T AXIS: 35 DEGREES
VENTRICULAR RATE: 93 BPM

## 2024-11-12 PROCEDURE — 3077F SYST BP >= 140 MM HG: CPT | Performed by: FAMILY MEDICINE

## 2024-11-12 PROCEDURE — 99214 OFFICE O/P EST MOD 30 MIN: CPT | Performed by: FAMILY MEDICINE

## 2024-11-12 PROCEDURE — 3080F DIAST BP >= 90 MM HG: CPT | Performed by: FAMILY MEDICINE

## 2024-11-12 PROCEDURE — 3008F BODY MASS INDEX DOCD: CPT | Performed by: FAMILY MEDICINE

## 2024-11-12 PROCEDURE — 93000 ELECTROCARDIOGRAM COMPLETE: CPT | Performed by: FAMILY MEDICINE

## 2024-11-12 RX ORDER — LOSARTAN POTASSIUM 100 MG/1
100 TABLET ORAL DAILY
Qty: 90 TABLET | Refills: 0 | Status: SHIPPED | OUTPATIENT
Start: 2024-11-12

## 2024-11-12 RX ORDER — SILDENAFIL 50 MG/1
50 TABLET, FILM COATED ORAL
Qty: 30 TABLET | Refills: 3 | Status: SHIPPED | OUTPATIENT
Start: 2024-11-12

## 2024-11-12 NOTE — ASSESSMENT & PLAN NOTE
Patient with hypertension  Blood pressures are still elevated in the office.  Increase losartan to 100 mg daily - up-titrate gradually  Monitor blood pressures at home.  Baseline EKG normal in office today  Elevated ASCVD risk score - CT calcium score ordered  Follow-up in 2 weeks.

## 2024-11-12 NOTE — ASSESSMENT & PLAN NOTE
Patient with proteinuria and hematuria seen on UA.  Will refer to nephrology for further evaluation.

## 2024-11-12 NOTE — ASSESSMENT & PLAN NOTE
Lifestyle modifications discussed.  Weight loss advised  Smoking cessation advised.  Sildenafil 50 mg as needed prior to sex - this has been working well

## 2024-11-12 NOTE — PROGRESS NOTES
FAMILY MEDICINE CLINIC NOTE    HPI  Forrest Ferguson is a 53 year old male presenting for     #HTN  -losartan 25 mg daily  -elevated ASCVD risk score  -not monitoring blood pressures at home   -no issues with medication     #Proteinuria and hematuria  -will refer to nephrology     #ED  -intermittently having hard time getting erection  -still with morning erections   -sildenafil 50 mg - works well    #Elevated liver enzymes  -10/2024     #Cigarette  -0.5 ppd smoker      ROS  GENERAL: No fever/chills, no recent weight loss  HEENT: No visual changes, no changes in hearing, no sore throats  NECK: No pain, no swelling  RESP: No cough, no SOB  CV: No chest pain, no palpitations  GI: No abd pain, no N/V/D  MSK: No edema  SKIN: No new rashes  NEURO: No numbness, no tingling, no headaches    HEALTH MAINTENANCE  Health Maintenance Topics with due status: Overdue       Topic Date Due    Colorectal Cancer Screening Never done    PSA Never done    Zoster Vaccines Never done    COVID-19 Vaccine 09/01/2024    Influenza Vaccine Never done       ALLERGIES  Allergies[1]    MEDICATIONS  Current Outpatient Medications   Medication Sig Dispense Refill    losartan 100 MG Oral Tab Take 1 tablet (100 mg total) by mouth daily. 90 tablet 0    Sildenafil Citrate 50 MG Oral Tab Take 1 tablet (50 mg total) by mouth daily as needed for Erectile Dysfunction. 30 tablet 3       ACTIVE PROBLEMS  Patient Active Problem List   Diagnosis    Cigarette smoker    Health maintenance examination    Class 1 obesity due to excess calories with serious comorbidity and body mass index (BMI) of 32.0 to 32.9 in adult    Erectile dysfunction    Primary hypertension    Proteinuria    Hematuria    Elevated liver enzymes       PAST MEDICAL HISTORY  Past Medical History:    Primary hypertension       PAST SOCIAL HISTORY  Social History     Socioeconomic History    Marital status: Single     Spouse name: Not on file    Number of children: Not on file    Years of  education: Not on file    Highest education level: Not on file   Occupational History    Not on file   Tobacco Use    Smoking status: Every Day     Current packs/day: 0.50     Average packs/day: 0.5 packs/day for 36.9 years (18.4 ttl pk-yrs)     Types: Cigarettes     Start date: 1988    Smokeless tobacco: Never   Vaping Use    Vaping status: Never Used   Substance and Sexual Activity    Alcohol use: Yes     Comment: Occcasional - weekends    Drug use: Never    Sexual activity: Yes     Partners: Female   Other Topics Concern    Not on file   Social History Narrative    Relationships:     Children: Dania Guillen Maklya (12F)    Pets: None    School: N/A    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     Spiritual: Spiritual     Social Drivers of Health     Financial Resource Strain: Not on file   Food Insecurity: Not on file   Transportation Needs: Not on file   Physical Activity: Not on file   Stress: Not on file   Social Connections: Not on file   Housing Stability: Not on file       PAST SURGICAL HISTORY  Past Surgical History:   Procedure Laterality Date    Abdominal surgery      [employer]       PAST FAMILY HISTORY  Family History   Problem Relation Age of Onset    Cancer Mother         Cervical    Cancer Father         Pancreatic    No Known Problems Sister     No Known Problems Sister     No Known Problems Sister     No Known Problems Brother     Colon Cancer Brother     No Known Problems Brother     No Known Problems Brother     No Known Problems Maternal Grandmother     No Known Problems Maternal Grandfather     No Known Problems Paternal Grandmother     No Known Problems Paternal Grandfather     Prostate Cancer Neg          PHYSICAL EXAM  Vitals:    11/12/24 1541   BP: (!) 146/94   Pulse: 101   Temp: 97.6 °F (36.4 °C)   SpO2: 97%   Weight: 206 lb (93.4 kg)   Height: 5' 7\" (1.702 m)      Body mass index is 32.26 kg/m².    GENERAL: NAD  NECK: Supple,  non-tender  RESP: Non-labored respirations, CTAB, no wheezing, no rales, no rhonchi  CV: RRR, no murmurs  NEURO: Answering questions appropriately    LABS  Lab Results   Component Value Date    WBC 4.8 10/29/2024    HGB 15.8 10/29/2024    HCT 46.2 10/29/2024    .0 10/29/2024    NEPERCENT 57.2 10/29/2024    LYPERCENT 31.5 10/29/2024    MOPERCENT 9.0 10/29/2024    EOPERCENT 1.3 10/29/2024    BAPERCENT 0.6 10/29/2024    NE 2.75 10/29/2024    LYMABS 1.51 10/29/2024    MOABSO 0.43 10/29/2024    EOABSO 0.06 10/29/2024    BAABSO 0.03 10/29/2024       Lab Results   Component Value Date     10/29/2024    K 4.1 10/29/2024     10/29/2024    CO2 25.0 10/29/2024    ANIONGAP 10 10/29/2024    BUN 15 10/29/2024    CREATSERUM 1.23 10/29/2024    BUNCREA 12.2 10/29/2024     (H) 10/29/2024    CA 10.0 10/29/2024    OSMOCALC 303 (H) 10/29/2024    ALT 44 10/29/2024    AST 46 (H) 10/29/2024    ALKPHO 56 10/29/2024    BILT 0.5 10/29/2024    TP 7.9 10/29/2024    ALB 4.7 10/29/2024    GLOBULIN 3.2 10/29/2024    ELECTAG 1.5 10/29/2024    FASTING No 10/29/2024    FASTING No 10/29/2024         Lab Results   Component Value Date    CHOLEST 171 10/29/2024    TRIG 388 (H) 10/29/2024    HDL 49 10/29/2024    LDL 62 10/29/2024    VLDL 58 (H) 10/29/2024    NONHDLC 122 10/29/2024        DIAGNOSTICS      ASSESSMENT/PLAN  Problem List Items Addressed This Visit          Cardiac and Vasculature    Primary hypertension - Primary     Patient with hypertension  Blood pressures are still elevated in the office.  Increase losartan to 100 mg daily - up-titrate gradually  Monitor blood pressures at home.  Baseline EKG normal in office today  Elevated ASCVD risk score - CT calcium score ordered  Follow-up in 2 weeks.         Relevant Medications    losartan 100 MG Oral Tab    Sildenafil Citrate 50 MG Oral Tab    Other Relevant Orders    EKG In-Office [11452] (Completed)    CT CALCIUM SCORING       Endocrine and Metabolic    Class 1  obesity due to excess calories with serious comorbidity and body mass index (BMI) of 32.0 to 32.9 in adult     Healthy diet and exercise advised            Gastrointestinal and Abdominal    Elevated liver enzymes     Diet and exercise advised.             Genitourinary and Reproductive    Erectile dysfunction     Lifestyle modifications discussed.  Weight loss advised  Smoking cessation advised.  Sildenafil 50 mg as needed prior to sex - this has been working well         Relevant Medications    losartan 100 MG Oral Tab    Sildenafil Citrate 50 MG Oral Tab    Hematuria     Patient with proteinuria and hematuria seen on UA.  Will refer to nephrology for further evaluation.          Relevant Medications    losartan 100 MG Oral Tab    Other Relevant Orders    Nephrology Referral - In Network    Proteinuria     Patient with proteinuria and hematuria seen on UA.  Will refer to nephrology for further evaluation.          Relevant Medications    losartan 100 MG Oral Tab    Other Relevant Orders    Nephrology Referral - In Network       Tobacco    Cigarette smoker     Patient is a cigarette smoker  Smoking cessation advised            Return in about 2 weeks (around 2024) for follow up.    No topic due editable text     Bradley Gandara MD  Family Medicine           Pre-chartin minutes  Reviewing/obtaining: 10 minutes  Medical Exam:1 minutes  Counseling/education: 5 minutes  Notes: 5 minutes  Referring/communicatin minutes  Care coordination: 0 minutes    My total time spent caring for the patient on the day of the encounter: 23 minutes         [1]   Allergies  Allergen Reactions    Penicillins UNKNOWN

## 2024-11-12 NOTE — PATIENT INSTRUCTIONS
PATIENT INSTRUCTIONS    Thank you for seeing me today, it was a pleasure taking care of you.  Please check out at the  and schedule a follow up appointment.  Return in about 2 weeks (around 11/26/2024) for follow up.  Please remember that the preferred danielle period for appointments is 5 minutes. This is to help maximize the amount of time that we can spend together at our visits.    See nephrology (kidney doctor)  Provider Address Phone   [address] [facility] [address] [phone]       Losartan 50 mg daily for 3 days, then increase to losartan 100 mg daily until you see me   If possible, monitor blood pressures at home   CT calcium score   Please call [phone] to schedule your imaging appointment.   Cologuard stool test     Dr. Juliocesar Vargas

## 2024-11-25 NOTE — PROGRESS NOTES
FAMILY MEDICINE CLINIC NOTE    HPI  Forrest Ferguson is a 53 year old male presenting for         #HTN  -losartan 100 mg daily  -elevated ASCVD risk score  -not monitoring blood pressures at home - Bps still in the 140s  -no issues with medication      #Proteinuria and hematuria  -referred to nephrology    ----other     #ED  -sildenafil 50 mg - works well     #Elevated liver enzymes  -10/2024      #Cigarette  -0.5 ppd smoker      ROS  GENERAL: No fever/chills, no recent weight loss  HEENT: No visual changes, no changes in hearing, no sore throats  NECK: No pain, no swelling  RESP: No cough, no SOB  CV: No chest pain, no palpitations  GI: No abd pain, no N/V/D  MSK: No edema  SKIN: No new rashes  NEURO: No numbness, no tingling, no headaches    HEALTH MAINTENANCE  Health Maintenance Topics with due status: Overdue       Topic Date Due    Colorectal Cancer Screening Never done    PSA Never done    Zoster Vaccines Never done    COVID-19 Vaccine 09/01/2024    Influenza Vaccine Never done       ALLERGIES  Allergies[1]    MEDICATIONS  Current Outpatient Medications   Medication Sig Dispense Refill    losartan-hydroCHLOROthiazide 100-25 MG Oral Tab Take 1 tablet by mouth daily. 90 tablet 3    Sildenafil Citrate 50 MG Oral Tab Take 1 tablet (50 mg total) by mouth daily as needed for Erectile Dysfunction. 30 tablet 3       ACTIVE PROBLEMS  Patient Active Problem List   Diagnosis    Cigarette smoker    Health maintenance examination    Class 1 obesity due to excess calories with serious comorbidity and body mass index (BMI) of 32.0 to 32.9 in adult    Erectile dysfunction    Primary hypertension    Proteinuria    Hematuria    Elevated liver enzymes       PAST MEDICAL HISTORY  Past Medical History:    Primary hypertension       PAST SOCIAL HISTORY  Social History     Socioeconomic History    Marital status: Single     Spouse name: Not on file    Number of children: Not on file    Years of education: Not on file    Highest  education level: Not on file   Occupational History    Not on file   Tobacco Use    Smoking status: Every Day     Current packs/day: 0.50     Average packs/day: 0.5 packs/day for 36.9 years (18.5 ttl pk-yrs)     Types: Cigarettes     Start date: 1988    Smokeless tobacco: Never   Vaping Use    Vaping status: Never Used   Substance and Sexual Activity    Alcohol use: Yes     Comment: Occcasional - weekends    Drug use: Never    Sexual activity: Yes     Partners: Female   Other Topics Concern    Not on file   Social History Narrative    Relationships:     Children: Dania Guillen Maklya (12F)    Pets: None    School: N/A    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     Spiritual: Spiritual     Social Drivers of Health     Financial Resource Strain: Not on file   Food Insecurity: Not on file   Transportation Needs: Not on file   Physical Activity: Not on file   Stress: Not on file   Social Connections: Not on file   Housing Stability: Not on file       PAST SURGICAL HISTORY  Past Surgical History:   Procedure Laterality Date    Abdominal surgery      [employer]       PAST FAMILY HISTORY  Family History   Problem Relation Age of Onset    Cancer Mother         Cervical    Cancer Father         Pancreatic    No Known Problems Sister     No Known Problems Sister     No Known Problems Sister     No Known Problems Brother     Colon Cancer Brother     No Known Problems Brother     No Known Problems Brother     No Known Problems Maternal Grandmother     No Known Problems Maternal Grandfather     No Known Problems Paternal Grandmother     No Known Problems Paternal Grandfather     Prostate Cancer Neg          PHYSICAL EXAM  Vitals:    11/26/24 1333   BP: (!) 148/98   Pulse: 85   Temp: 97.6 °F (36.4 °C)   SpO2: 98%   Weight: 208 lb (94.3 kg)   Height: 5' 7\" (1.702 m)      Body mass index is 32.58 kg/m².    GENERAL: NAD  RESP: Non-labored respirations, CTAB, no wheezing, no  rales, no rhonchi  CV: RRR, no murmurs  GI: Soft, non-distended, non-tender, no guarding, no rebound, no masses  MSK: No edema  SKIN: Warm and dry, no rashes  NEURO: Answering questions appropriately    LABS  Lab Results   Component Value Date    WBC 4.8 10/29/2024    HGB 15.8 10/29/2024    HCT 46.2 10/29/2024    .0 10/29/2024    NEPERCENT 57.2 10/29/2024    LYPERCENT 31.5 10/29/2024    MOPERCENT 9.0 10/29/2024    EOPERCENT 1.3 10/29/2024    BAPERCENT 0.6 10/29/2024    NE 2.75 10/29/2024    LYMABS 1.51 10/29/2024    MOABSO 0.43 10/29/2024    EOABSO 0.06 10/29/2024    BAABSO 0.03 10/29/2024       Lab Results   Component Value Date     10/29/2024    K 4.1 10/29/2024     10/29/2024    CO2 25.0 10/29/2024    ANIONGAP 10 10/29/2024    BUN 15 10/29/2024    CREATSERUM 1.23 10/29/2024    BUNCREA 12.2 10/29/2024     (H) 10/29/2024    CA 10.0 10/29/2024    OSMOCALC 303 (H) 10/29/2024    ALT 44 10/29/2024    AST 46 (H) 10/29/2024    ALKPHO 56 10/29/2024    BILT 0.5 10/29/2024    TP 7.9 10/29/2024    ALB 4.7 10/29/2024    GLOBULIN 3.2 10/29/2024    ELECTAG 1.5 10/29/2024    FASTING No 10/29/2024    FASTING No 10/29/2024         Lab Results   Component Value Date    CHOLEST 171 10/29/2024    TRIG 388 (H) 10/29/2024    HDL 49 10/29/2024    LDL 62 10/29/2024    VLDL 58 (H) 10/29/2024    NONHDLC 122 10/29/2024        DIAGNOSTICS      ASSESSMENT/PLAN  Problem List Items Addressed This Visit          Cardiac and Vasculature    Primary hypertension - Primary     Patient with hypertension  Blood pressures are still elevated in the office.  Increase to losartan hydrochlorothiazide 100-25 mg daily  Monitor blood pressures at home.  Elevated ASCVD risk score - CT calcium score ordered  Follow-up in 2 weeks.         Relevant Medications    losartan-hydroCHLOROthiazide 100-25 MG Oral Tab       Genitourinary and Reproductive    Hematuria     Patient with proteinuria and hematuria seen on UA.  Referred to nephrology  for further evaluation.          Relevant Medications    losartan-hydroCHLOROthiazide 100-25 MG Oral Tab    Proteinuria     Patient with proteinuria and hematuria seen on UA.  Referred to nephrology for further evaluation.          Relevant Medications    losartan-hydroCHLOROthiazide 100-25 MG Oral Tab       Health Encounters    Health maintenance examination     Await cologuard - he reports address was wrong            Return in about 2 weeks (around 12/10/2024) for follow up.    No topic due editable text     Bradley Gandara MD  Family Medicine           Pre-chartin minutes  Reviewing/obtaining: 3 minutes  Medical Exam:1 minutes  Counseling/education: 5 minutes  Notes: 2 minutes  Referring/communicatin minutes  Care coordination: 0 minutes    My total time spent caring for the patient on the day of the encounter: 13 minutes         [1]   Allergies  Allergen Reactions    Penicillins UNKNOWN

## 2024-11-26 NOTE — PATIENT INSTRUCTIONS
PATIENT INSTRUCTIONS    Thank you for seeing me today, it was a pleasure taking care of you.  Please check out at the  and schedule a follow up appointment.  Return in about 2 weeks (around 12/10/2024) for follow up.  Please remember that the preferred danielle period for appointments is 5 minutes. This is to help maximize the amount of time that we can spend together at our visits.    Losartan hydrochlorothiazide 100-25 mg daily  Schedule appointment to see kidney doctor  Cologuard test  Continue monitoring blood pressures     Best,  Dr. Gandara

## 2024-11-26 NOTE — ASSESSMENT & PLAN NOTE
Patient with proteinuria and hematuria seen on UA.  Referred to nephrology for further evaluation.

## 2024-11-26 NOTE — ASSESSMENT & PLAN NOTE
Patient with hypertension  Blood pressures are still elevated in the office.  Increase to losartan hydrochlorothiazide 100-25 mg daily  Monitor blood pressures at home.  Elevated ASCVD risk score - CT calcium score ordered  Follow-up in 2 weeks.

## 2024-12-09 NOTE — ASSESSMENT & PLAN NOTE
Patient with hypertension  Blood pressures are improved  Continue losartan hydrochlorothiazide 100-25 mg daily  Elevated ASCVD risk score - CT calcium score ordered

## 2024-12-09 NOTE — PROGRESS NOTES
FAMILY MEDICINE CLINIC NOTE    HPI  Forrest Ferguson is a 54 year old male presenting for      #HTN  -losartan hydrochlorothiazide 100 -50 mg daily  -elevated ASCVD risk score  -not monitoring blood pressures at home - Bps still in the 140s  -no issues with medication     #Proteinuria and hematuria  -referred to nephrology - has appointment coming up     #Cigarette  -0.5 ppd smoker    #HM  -he reports he sent in [employer] kit      ----other     #ED  -sildenafil 50 mg - works well     #Elevated liver enzymes  -10/2024       ROS  GENERAL: No fever/chills, no recent weight loss  HEENT: No visual changes, no changes in hearing, no sore throats  NECK: No pain, no swelling  RESP: No cough, no SOB  CV: No chest pain, no palpitations  GI: No abd pain, no N/V/D  MSK: No edema  SKIN: No new rashes  NEURO: No numbness, no tingling, no headaches    HEALTH MAINTENANCE  Health Maintenance Topics with due status: Overdue       Topic Date Due    Colorectal Cancer Screening Never done    PSA Never done    Zoster Vaccines Never done    COVID-19 Vaccine 09/01/2024    Influenza Vaccine Never done       ALLERGIES  Allergies[1]    MEDICATIONS  Current Outpatient Medications   Medication Sig Dispense Refill    losartan-hydroCHLOROthiazide 100-25 MG Oral Tab Take 1 tablet by mouth daily. 90 tablet 3    Sildenafil Citrate 50 MG Oral Tab Take 1 tablet (50 mg total) by mouth daily as needed for Erectile Dysfunction. 30 tablet 3       ACTIVE PROBLEMS  Patient Active Problem List   Diagnosis    Cigarette smoker    Health maintenance examination    Class 1 obesity due to excess calories with serious comorbidity and body mass index (BMI) of 32.0 to 32.9 in adult    Erectile dysfunction    Primary hypertension    Proteinuria    Hematuria    Elevated liver enzymes       PAST MEDICAL HISTORY  Past Medical History:    Primary hypertension       PAST SOCIAL HISTORY  Social History     Socioeconomic History    Marital status: Single     Spouse name: Not  on file    Number of children: Not on file    Years of education: Not on file    Highest education level: Not on file   Occupational History    Not on file   Tobacco Use    Smoking status: Every Day     Current packs/day: 0.50     Average packs/day: 0.5 packs/day for 36.9 years (18.5 ttl pk-yrs)     Types: Cigarettes     Start date: 1988    Smokeless tobacco: Never   Vaping Use    Vaping status: Never Used   Substance and Sexual Activity    Alcohol use: Yes     Comment: Occcasional - weekends    Drug use: Never    Sexual activity: Yes     Partners: Female   Other Topics Concern    Not on file   Social History Narrative    Relationships:     Children: Dania Guillen Maklya (12F)    Pets: None    School: N/A    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     Spiritual: Spiritual     Social Drivers of Health     Financial Resource Strain: Not on file   Food Insecurity: Not on file   Transportation Needs: Not on file   Physical Activity: Not on file   Stress: Not on file   Social Connections: Not on file   Housing Stability: Not on file       PAST SURGICAL HISTORY  Past Surgical History:   Procedure Laterality Date    Abdominal surgery      [employer]       PAST FAMILY HISTORY  Family History   Problem Relation Age of Onset    Cancer Mother         Cervical    Cancer Father         Pancreatic    No Known Problems Sister     No Known Problems Sister     No Known Problems Sister     No Known Problems Brother     Colon Cancer Brother     No Known Problems Brother     No Known Problems Brother     No Known Problems Maternal Grandmother     No Known Problems Maternal Grandfather     No Known Problems Paternal Grandmother     No Known Problems Paternal Grandfather     Prostate Cancer Neg          PHYSICAL EXAM  Vitals:    12/09/24 1032 12/09/24 1040   BP: 142/86 128/84   Pulse: 102    SpO2: 97%    Weight: 209 lb (94.8 kg)    Height: 5' 7\" (1.702 m)       Body mass index is  32.73 kg/m².    GENERAL: NAD  RESP: Non-labored respirations, CTAB, no wheezing, no rales, no rhonchi  CV: RRR, no murmurs  MSK: No edema  SKIN: Warm and dry, no rashes  NEURO: Answering questions appropriately    LABS  Lab Results   Component Value Date    WBC 4.8 10/29/2024    HGB 15.8 10/29/2024    HCT 46.2 10/29/2024    .0 10/29/2024    NEPERCENT 57.2 10/29/2024    LYPERCENT 31.5 10/29/2024    MOPERCENT 9.0 10/29/2024    EOPERCENT 1.3 10/29/2024    BAPERCENT 0.6 10/29/2024    NE 2.75 10/29/2024    LYMABS 1.51 10/29/2024    MOABSO 0.43 10/29/2024    EOABSO 0.06 10/29/2024    BAABSO 0.03 10/29/2024       Lab Results   Component Value Date     10/29/2024    K 4.1 10/29/2024     10/29/2024    CO2 25.0 10/29/2024    ANIONGAP 10 10/29/2024    BUN 15 10/29/2024    CREATSERUM 1.23 10/29/2024    BUNCREA 12.2 10/29/2024     (H) 10/29/2024    CA 10.0 10/29/2024    OSMOCALC 303 (H) 10/29/2024    ALT 44 10/29/2024    AST 46 (H) 10/29/2024    ALKPHO 56 10/29/2024    BILT 0.5 10/29/2024    TP 7.9 10/29/2024    ALB 4.7 10/29/2024    GLOBULIN 3.2 10/29/2024    ELECTAG 1.5 10/29/2024    FASTING No 10/29/2024    FASTING No 10/29/2024         Lab Results   Component Value Date    CHOLEST 171 10/29/2024    TRIG 388 (H) 10/29/2024    HDL 49 10/29/2024    LDL 62 10/29/2024    VLDL 58 (H) 10/29/2024    NONHDLC 122 10/29/2024        DIAGNOSTICS      ASSESSMENT/PLAN  Problem List Items Addressed This Visit          Cardiac and Vasculature    Primary hypertension - Primary     Patient with hypertension  Blood pressures are improved  Continue losartan hydrochlorothiazide 100-25 mg daily  Elevated ASCVD risk score - CT calcium score ordered            Endocrine and Metabolic    Class 1 obesity due to excess calories with serious comorbidity and body mass index (BMI) of 32.0 to 32.9 in adult     Healthy diet and exercise advised            Genitourinary and Reproductive    Hematuria     Patient with proteinuria and  hematuria seen on UA.  Referred to nephrology for further evaluation.          Proteinuria     Patient with proteinuria and hematuria seen on UA.  Referred to nephrology for further evaluation.             Tobacco    Cigarette smoker     Patient is a cigarette smoker  Smoking cessation advised            Return in about 1 year (around 2025) for physical.    No topic due editable text     Bradley Gandara MD  Family Medicine           Pre-chartin minutes  Reviewing/obtaining: 10 minutes  Medical Exam:1 minutes  Counseling/education: 5 minutes  Notes: 5 minutes  Referring/communicatin minutes  Care coordination: 0 minutes    My total time spent caring for the patient on the day of the encounter: 23 minutes         [1]   Allergies  Allergen Reactions    Penicillins UNKNOWN

## 2024-12-09 NOTE — PATIENT INSTRUCTIONS
PATIENT INSTRUCTIONS    Thank you for seeing me today, it was a pleasure taking care of you.  Please check out at the  and schedule a follow up appointment.  Return in about 1 year (around 12/9/2025) for physical.  Please remember that the preferred danielle period for appointments is 5 minutes. This is to help maximize the amount of time that we can spend together at our visits.    Continue losartan hydrochlorothiazide 100-25 mg daily  See nephrology    Dr. Juliocesar Vargas

## 2025-01-27 NOTE — PROGRESS NOTES
Consult Requested By: Dr. Bradley Gandara    Reason for Consult: proteinuria  and hematuria     HPI:       53 y/o M with h/o HTN ( recently dx and started medication) , obesity here for initial evaluation for proteinuria  and hematuria   + tobacco  abuse ( 20 + yrs) , occasional etoh, recent recreational marijuana   No FH of kidney disease, denies nsaid use.     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)      HISTORY:  Past Medical History:    Essential hypertension    Primary hypertension      Past Surgical History:   Procedure Laterality Date    Abdominal surgery      [employer]      Family History   Problem Relation Age of Onset    Cancer Mother         Cervical    Cancer Father         Pancreatic    No Known Problems Sister     No Known Problems Sister     No Known Problems Sister     No Known Problems Brother     Colon Cancer Brother     No Known Problems Brother     No Known Problems Brother     No Known Problems Maternal Grandmother     No Known Problems Maternal Grandfather     No Known Problems Paternal Grandmother     No Known Problems Paternal Grandfather     Prostate Cancer Neg       Social History:   Social History     Socioeconomic History    Marital status: Single   Tobacco Use    Smoking status: Every Day     Current packs/day: 0.50     Average packs/day: 0.5 packs/day for 37.1 years (18.5 ttl pk-yrs)     Types: Cigarettes     Start date: 1988    Smokeless tobacco: Never   Vaping Use    Vaping status: Never Used   Substance and Sexual Activity    Alcohol use: Yes     Alcohol/week: 6.0 standard drinks of alcohol     Types: 6 Cans of beer per week     Comment: Occcasional - weekends    Drug use: Yes     Types: Cannabis     Comment: only  one time    Sexual activity: Yes     Partners: Female   Social History Narrative    Relationships:     Children:  Dania Guillen Maklya (12F)    Pets: None    School: N/A    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     Spiritual: Spiritual        Medications (Active prior to today's visit):  Current Outpatient Medications   Medication Sig Dispense Refill    losartan-hydroCHLOROthiazide 100-25 MG Oral Tab Take 1 tablet by mouth daily. 90 tablet 3    Sildenafil Citrate 50 MG Oral Tab Take 1 tablet (50 mg total) by mouth daily as needed for Erectile Dysfunction. 30 tablet 3       Allergies:  Allergies[1]      ROS:     Constitutional:  Negative for decreased activity, fever, irritability and lethargy  ENMT:  Negative for ear drainage, hearing loss and nasal drainage  Eyes:  Negative for eye discharge and vision loss  Cardiovascular:  Negative for chest pain, sobs  Respiratory:  Negative for cough, dyspnea and wheezing  Gastrointestinal:  Negative for abdominal pain, constipation  Genitourinary:  Negative for dysuria and hematuria  Endocrine:  Negative for abnormal sleep patterns, increased activity  Hema/Lymph:  Negative for easy bleeding and easy bruising  Integumentary:  Negative for pruritus and rash  Musculoskeletal:  Negative for bone/joint symptoms  Neurological:  Negative for gait disturbance  Psychiatric:  Negative for inappropriate interaction and psychiatric symptoms      Vitals:    01/27/25 1306   BP: (!) 176/72   Pulse: 85       PHYSICAL EXAM:   Constitutional: appears well hydrated alert and responsive no acute distress noted  Head/Face: normocephalic  Eyes/Vision: normal extraocular motion is intact  Nose/Mouth/Throat:mucous membranes are moist   Neck/Thyroid: neck is supple without adenopathy  Lymphatic: no abnormal cervical, supraclavicular adenopathy is noted  Respiratory:  lungs are clear to auscultation bilaterally  Cardiovascular: regular rate and rhythm  Abdomen: soft, non-tender, non-distended, BS normal  Skin/Hair: no unusual rashes  present  Back/Spine: no abnormalities noted  Musculoskeletal:  no deformities  Extremities: no edema  Neurological:  Grossly normal    Lab Results   Component Value Date     10/29/2024    K 4.1 10/29/2024     10/29/2024    CO2 25.0 10/29/2024    BUN 15 10/29/2024    CREATSERUM 1.23 10/29/2024    CA 10.0 10/29/2024    EGFRCR 71 10/29/2024    ALB 4.7 10/29/2024      ASSESSMENT/PLAN:   Assessment   1. Proteinuria, unspecified type  - Urinalysis, Routine; Future  - Protein/Creatinine Ratio, Urine Random; Future  - Microalb/Creat Ratio, Random Urine; Future  - US KIDNEY/BLADDER (CPT=76770); Future  - Basic Metabolic Panel (8); Future    2. Other microscopic hematuria  - Urinalysis, Routine; Future  - Protein/Creatinine Ratio, Urine Random; Future  - Microalb/Creat Ratio, Random Urine; Future  - US KIDNEY/BLADDER (CPT=76770); Future  - Basic Metabolic Panel (8); Future    3. Hypertension, unspecified type    4. Cigarette smoker    5. Class 1 obesity due to excess calories with serious comorbidity and body mass index (BMI) of 32.0 to 32.9 in adult       1. Proteinuria/ hematuria  Preserved renal fx  Can be sec to longstanding HTN ( undiagnosed) vs any other etiology.   Ua with concentrated urine   Will repeat UA and quantify protein  Discussed good bp control, avoiding nsaids, quit smoking and losing weight  Also order renal us  Depending on th results will order further hematuria/proteinuria kat vs urology evaluation     2 HTN  Well controlled at home  ( checks it almost daily) and finally bp been in 130/70's range  Low salt diet  On ARB/hctz    3 obesity  Weight loss discussed    4. Tobacco abuse  Counseled quitting    PLAN  Repeat labs  Bp check at home  Low salt diet  Renal us         Orders This Visit:  Orders Placed This Encounter   Procedures    Urinalysis, Routine    Protein/Creatinine Ratio, Urine Random    Microalb/Creat Ratio, Random Urine    Basic Metabolic Panel (8)       Meds This Visit:  Requested  Prescriptions      No prescriptions requested or ordered in this encounter       Imaging & Referrals:  US KIDNEY/BLADDER (ZJT=[id number])     Rosemarie Mckeon MD  1/27/2025            [1]   Allergies  Allergen Reactions    Penicillins UNKNOWN

## 2025-01-27 NOTE — PATIENT INSTRUCTIONS
Do blood and urine test this week  Schedule renal US  Hydrate well  Quit smoking    Fu in 4-5 weeks

## 2025-06-02 NOTE — TELEPHONE ENCOUNTER
Refill passed per Clinic protocol.  Requested Prescriptions   Pending Prescriptions Disp Refills    Sildenafil Citrate 50 MG Oral Tab [Pharmacy Med Name: Sildenafil Citrate 50 Mg Tab Auro] 30 tablet 0     Sig: Take 1 tablet (50 mg total) by mouth daily as needed for Erectile Dysfunction.       Genitourinary Medications Passed - 6/2/2025 12:25 PM        Passed - Patient does not have pulmonary hypertension on problem list        Passed - In person appointment or virtual visit in the past 12 mos or appointment in next 3 mos     Recent Outpatient Visits              4 months ago Proteinuria, unspecified type    [facility], [facility], Rosemarie Anthony MD    Office Visit    5 months ago Primary hypertension    [facility], Bradley Poon MD    Office Visit    6 months ago Primary hypertension    [facility], Bradley Poon MD    Office Visit    6 [facility]    [facility]    7 months ago Health maintenance examination    [facility], Bradley Poon MD    Office Visit                      Passed - Medication is active on med list